# Patient Record
Sex: FEMALE | Race: WHITE | NOT HISPANIC OR LATINO | Employment: UNEMPLOYED | URBAN - METROPOLITAN AREA
[De-identification: names, ages, dates, MRNs, and addresses within clinical notes are randomized per-mention and may not be internally consistent; named-entity substitution may affect disease eponyms.]

---

## 2022-10-05 ENCOUNTER — OFFICE VISIT (OUTPATIENT)
Dept: OBSTETRICS AND GYNECOLOGY | Age: 47
End: 2022-10-05

## 2022-10-05 VITALS
WEIGHT: 146 LBS | DIASTOLIC BLOOD PRESSURE: 72 MMHG | SYSTOLIC BLOOD PRESSURE: 130 MMHG | BODY MASS INDEX: 25.87 KG/M2 | HEIGHT: 63 IN

## 2022-10-05 DIAGNOSIS — Z11.51 SPECIAL SCREENING EXAMINATION FOR HUMAN PAPILLOMAVIRUS (HPV): ICD-10-CM

## 2022-10-05 DIAGNOSIS — Z13.89 SCREENING FOR BLOOD OR PROTEIN IN URINE: ICD-10-CM

## 2022-10-05 DIAGNOSIS — Z12.31 SCREENING MAMMOGRAM, ENCOUNTER FOR: ICD-10-CM

## 2022-10-05 DIAGNOSIS — Z01.419 WELL WOMAN EXAM WITH ROUTINE GYNECOLOGICAL EXAM: Primary | ICD-10-CM

## 2022-10-05 DIAGNOSIS — R93.89 ENDOMETRIAL THICKENING ON ULTRASOUND: ICD-10-CM

## 2022-10-05 DIAGNOSIS — N84.0 ENDOMETRIAL POLYP: ICD-10-CM

## 2022-10-05 DIAGNOSIS — Z12.4 SCREENING FOR MALIGNANT NEOPLASM OF THE CERVIX: ICD-10-CM

## 2022-10-05 PROBLEM — N60.19 FIBROCYSTIC DISEASE OF BREAST: Status: ACTIVE | Noted: 2020-01-08

## 2022-10-05 PROBLEM — E78.5 HYPERLIPIDEMIA: Status: ACTIVE | Noted: 2020-01-07

## 2022-10-05 LAB
BILIRUB BLD-MCNC: NEGATIVE MG/DL
GLUCOSE UR STRIP-MCNC: NEGATIVE MG/DL
KETONES UR QL: NEGATIVE
LEUKOCYTE EST, POC: NEGATIVE
NITRITE UR-MCNC: NEGATIVE MG/ML
PH UR: 6 [PH] (ref 5–8)
PROT UR STRIP-MCNC: NEGATIVE MG/DL
RBC # UR STRIP: ABNORMAL /UL
SP GR UR: 1.01 (ref 1–1.03)
UROBILINOGEN UR QL: NORMAL

## 2022-10-05 PROCEDURE — 81002 URINALYSIS NONAUTO W/O SCOPE: CPT | Performed by: OBSTETRICS & GYNECOLOGY

## 2022-10-05 PROCEDURE — 99386 PREV VISIT NEW AGE 40-64: CPT | Performed by: OBSTETRICS & GYNECOLOGY

## 2022-10-05 NOTE — PROGRESS NOTES
[facility]   Obstetrics and Gynecology     10/5/2022    Patient: Aranza Xiong          MR#:[id number]    History of Present Illness    Chief Complaint   Patient presents with   • Gynecologic Exam     new gyn- last pap at least 5yrs ago neg (no hx of abn), last mg 5 years ago normal (no hx of abn) pt reports breast US last year due to cystic breast.     Pt c/o right ovarian pain, pt reports having this pain for years and assumed it was ovulation pain but recently been more frequent and mainly on the right side        46 y.o. female  who presents for annual exam.    The patient presents with complaint of intermittent right lower quadrant pain that she attributes to ovulation.  Ultrasound is performed and shows a thickening in the endometrial cavity that suspect for a endometrial polyp.  The patient reports regular monthly menstrual cycles that are not excessively heavy.        Studies reviewed:  US Non-ob Transvaginal (10/05/2022 10:41)      Patient's last menstrual period was 2022 (exact date).  Obstetric History:  OB History        3    Para   3    Term   1       2    AB        Living   3       SAB        IAB        Ectopic        Molar        Multiple        Live Births   3               Menstrual History:     Patient's last menstrual period was 2022 (exact date).       Sexual History:       Social History     Substance and Sexual Activity   Sexual Activity Yes   • Partners: Male   • Birth control/protection: Vasectomy     ______________________________________  Patient Active Problem List   Diagnosis   • Hyperlipidemia   • Fibrocystic disease of breast   • Endometrial polyp   • Endometrial thickening on ultrasound     History reviewed. No pertinent past medical history.  History reviewed. No pertinent surgical history.  Social History     Tobacco Use   Smoking Status Never Smoker   Smokeless Tobacco Never Used     History reviewed. No pertinent family  "history.  Prior to Admission medications    Not on File     _______________________________________    Current contraception: vasectomy  History of abnormal Pap smear: no  Family history of uterine or ovarian cancer: no  Family History of colon cancer/colon polyps: no  History of abnormal mammogram: no  History of abnormal lipids: no    The following portions of the patient's history were reviewed and updated as appropriate: allergies, current medications, past family history, past medical history, past social history, past surgical history and problem list.    Review of Systems    Pertinent items are noted in HPI.       Objective   Physical Exam    /72   Ht 160 cm (63\")   Wt 66.2 kg (146 lb)   LMP 09/24/2022 (Exact Date)   Breastfeeding No   BMI 25.86 kg/m²    BP Readings from Last 3 Encounters:   10/05/22 130/72      Wt Readings from Last 3 Encounters:   10/05/22 66.2 kg (146 lb)        BMI: Estimated body mass index is 25.86 kg/m² as calculated from the following:    Height as of this encounter: 160 cm (63\").    Weight as of this encounter: 66.2 kg (146 lb).       General: alert, appears stated age and cooperative   Heart: regular rate and rhythm, S1, S2 normal, no murmur, click, rub or gallop   Lungs: clear to auscultation bilaterally   Abdomen: soft, non-tender, without masses, no organomegaly   Breast: inspection negative, no nipple discharge or bleeding, no masses or nodularity palpable   External genitalia/Vulva: External genitalia including bartholin's glands, Urethra, [place]'s gland and urethra meatus are normal, Perineum, rectum and anus appear normal  and Bladder appears normal without significant prolapse    Vagina: normal mucosa, normal discharge   Cervix: no lesions   Uterus: normal size, mobile and non-tender   Adnexa: normal adnexa     As part of wellness and prevention, the following topics were discussed with the patient:  Encouraged self breast exam  Physical activity and regular " exercised encouraged.           Assessment:  Diagnoses and all orders for this visit:    1. Well woman exam with routine gynecological exam (Primary)  -     IgP, Aptima HPV    2. Screening for blood or protein in urine  -     POC Urinalysis Dipstick    3. Endometrial polyp    4. Endometrial thickening on ultrasound    5. Screening for malignant neoplasm of the cervix  -     IgP, Aptima HPV    6. Special screening examination for human papillomavirus (HPV)  -     IgP, Aptima HPV    7. Screening mammogram, encounter for  -     Mammo Screening Digital Tomosynthesis Bilateral With CAD; Future    Discussed focal thickening in the endometrium in the fundal region.  Advised that the findings are suggestive of an endometrial polyp.  Advised the patient that she is to options for hysteroscopy with resection of the polyp using MyoSure or if she wanted to postpone intervention and reimage the area in 6 weeks that might be feasible.      Plan:  Return in about 1 year (around 10/5/2023) for Annual GYN exam, Patient will call for plan with polyp .    Dakotah Santiago MD  10/5/2022 11:23 EDT

## 2022-10-06 ENCOUNTER — PATIENT ROUNDING (BHMG ONLY) (OUTPATIENT)
Dept: OBSTETRICS AND GYNECOLOGY | Age: 47
End: 2022-10-06

## 2022-10-06 NOTE — PROGRESS NOTES
A MY CHART MESSAGE HAS BEEN SENT TO THE PATIENT FOR [facility].

## 2022-10-11 LAB
CYTOLOGIST CVX/VAG CYTO: NORMAL
CYTOLOGY CVX/VAG DOC CYTO: NORMAL
CYTOLOGY CVX/VAG DOC THIN PREP: NORMAL
DX ICD CODE: NORMAL
HIV 1 & 2 AB SER-IMP: NORMAL
HPV I/H RISK 4 DNA CVX QL PROBE+SIG AMP: NEGATIVE
OTHER STN SPEC: NORMAL
STAT OF ADQ CVX/VAG CYTO-IMP: NORMAL

## 2022-10-20 ENCOUNTER — TELEPHONE (OUTPATIENT)
Dept: OBSTETRICS AND GYNECOLOGY | Age: 47
End: 2022-10-20

## 2022-10-20 NOTE — TELEPHONE ENCOUNTER
I CALLED PT TO LET HER KNOW ABOUT HER MAMMO DATE AND TIME. PT STATES SHE DOES NOT WANT A MAMMOGRAM SHE USUALLY GETS ULTRASOUNDS. I TOLD THE PT THE LAST BREAST ULTRASOUND SHE HAD WAS IN 2020. PT WAS ADAMANT THAT SHE DOES NOT WANT ANY IMAGING. I ADVISED PT THAT I WOULD LET DR. DO KNOW. PT VOICED UNDERSTANDING.